# Patient Record
Sex: FEMALE | Race: WHITE | NOT HISPANIC OR LATINO | Employment: UNEMPLOYED | URBAN - NONMETROPOLITAN AREA
[De-identification: names, ages, dates, MRNs, and addresses within clinical notes are randomized per-mention and may not be internally consistent; named-entity substitution may affect disease eponyms.]

---

## 2017-11-14 DIAGNOSIS — M25.512 LEFT SHOULDER PAIN, UNSPECIFIED CHRONICITY: Primary | ICD-10-CM

## 2017-11-15 ENCOUNTER — APPOINTMENT (OUTPATIENT)
Dept: GENERAL RADIOLOGY | Facility: HOSPITAL | Age: 62
End: 2017-11-15
Attending: ORTHOPAEDIC SURGERY

## 2017-12-12 DIAGNOSIS — M25.512 LEFT SHOULDER PAIN, UNSPECIFIED CHRONICITY: Primary | ICD-10-CM

## 2017-12-13 ENCOUNTER — OFFICE VISIT (OUTPATIENT)
Dept: ORTHOPEDIC SURGERY | Facility: CLINIC | Age: 62
End: 2017-12-13

## 2017-12-13 ENCOUNTER — HOSPITAL ENCOUNTER (OUTPATIENT)
Dept: GENERAL RADIOLOGY | Facility: HOSPITAL | Age: 62
Discharge: HOME | End: 2017-12-13
Attending: ORTHOPAEDIC SURGERY | Admitting: ORTHOPAEDIC SURGERY

## 2017-12-13 VITALS
SYSTOLIC BLOOD PRESSURE: 124 MMHG | HEIGHT: 65 IN | HEART RATE: 68 BPM | WEIGHT: 133 LBS | BODY MASS INDEX: 22.16 KG/M2 | DIASTOLIC BLOOD PRESSURE: 68 MMHG

## 2017-12-13 DIAGNOSIS — M75.52 SUBACROMIAL BURSITIS OF LEFT SHOULDER JOINT: Primary | ICD-10-CM

## 2017-12-13 DIAGNOSIS — M25.512 LEFT SHOULDER PAIN, UNSPECIFIED CHRONICITY: ICD-10-CM

## 2017-12-13 PROBLEM — E07.9 THYROID DISEASE: Status: ACTIVE | Noted: 2017-12-13

## 2017-12-13 PROCEDURE — 20610 DRAIN/INJ JOINT/BURSA W/O US: CPT | Performed by: ORTHOPAEDIC SURGERY

## 2017-12-13 PROCEDURE — 73030 X-RAY EXAM OF SHOULDER: CPT

## 2017-12-13 PROCEDURE — 99203 OFFICE O/P NEW LOW 30 MIN: CPT | Performed by: ORTHOPAEDIC SURGERY

## 2017-12-13 PROCEDURE — 73030 X-RAY EXAM OF SHOULDER: CPT | Performed by: RADIOLOGY

## 2017-12-13 RX ORDER — LEVOTHYROXINE SODIUM 0.03 MG/1
TABLET ORAL
COMMUNITY
Start: 2017-12-01 | End: 2017-12-13 | Stop reason: SDUPTHER

## 2017-12-13 RX ORDER — LEVOTHYROXINE SODIUM 0.03 MG/1
TABLET ORAL
COMMUNITY
Start: 2017-09-25

## 2017-12-13 RX ORDER — OMEPRAZOLE 20 MG/1
CAPSULE, DELAYED RELEASE ORAL
COMMUNITY
Start: 2017-09-29

## 2017-12-13 RX ADMIN — LIDOCAINE HYDROCHLORIDE 2 ML: 20 INJECTION, SOLUTION INFILTRATION; PERINEURAL at 16:02

## 2017-12-13 RX ADMIN — METHYLPREDNISOLONE ACETATE 40 MG: 40 INJECTION, SUSPENSION INTRA-ARTICULAR; INTRALESIONAL; INTRAMUSCULAR; SOFT TISSUE at 16:02

## 2017-12-13 NOTE — PROGRESS NOTES
New Patient Visit      Patient: Angelica Reeves  YOB: 1955  Date of Encounter: 12/13/2017      History of Present Illness:     Angelica Reeves is a 62 y.o. female seen today secondary to a greater than one-year history of progressive onset worsening left shoulder pain.  Patient is referred today by Jony Valenzuela MD.  Patient denies specific injury.  She reports dull throbbing aching sensation to the anterior lateral shoulder worse upon range of motion or overhead activities.  She reports occasional grinding or popping sensation of the left shoulder with abduction.  She reports constant dull throbbing aching sensation which she is unable to sleep on the affected side followed by sharp stabbing pain upon overhead motions.  No prior treatment in terms of injections, therapy, NSAID medication.  Denies any paresthesia or associated neck or back pain.         Patient Active Problem List   Diagnosis   • Left shoulder pain   • Thyroid disease     No past medical history on file.  Past Surgical History:   Procedure Laterality Date   • TONSILLECTOMY       Social History     Occupational History   • Not on file.     Social History Main Topics   • Smoking status: Current Every Day Smoker   • Smokeless tobacco: Not on file   • Alcohol use No   • Drug use: No   • Sexual activity: Not on file      Social History     Social History Narrative   • No narrative on file     Family History   Problem Relation Age of Onset   • Osteoarthritis Mother    • Diabetes Mother    • Hypertension Mother    • Cancer Father    • Diabetes Father    • Heart disease Sister    • Diabetes Sister    • Hypertension Sister    • Heart disease Brother      Current Outpatient Prescriptions   Medication Sig Dispense Refill   • levothyroxine (SYNTHROID) 25 MCG tablet Take  by mouth.     • omeprazole (priLOSEC) 20 MG capsule        No current facility-administered medications for this visit.      No Known Allergies     Review of Systems   Constitution:  "Negative.   HENT: Positive for hearing loss.         Eye itching   Eyes: Negative.    Cardiovascular: Negative.    Respiratory: Negative.    Endocrine: Negative.    Hematologic/Lymphatic: Negative.    Skin: Negative.    Musculoskeletal:        Pertinent positives listed in HPI   Gastrointestinal: Negative.    Genitourinary: Negative.    Neurological: Negative.    Psychiatric/Behavioral: Negative.    Allergic/Immunologic: Negative.        Vitals:    12/13/17 1411   BP: 124/68   Pulse: 68   Weight: 60.3 kg (133 lb)   Height: 165.1 cm (65\")       Physical Exam: 62 y.o. female .  General Appearance:    Well appearing, cooperative, in no acute distress.       Patient is alert and oriented x 3.            Musculoskeletal: Examination of patient's left shoulder today reveals forward flexion to approximately 110° with mild pain upon further active range of motion.  Abduction to 90°.  Internal rotation to the lower lumbar region.  Adequate external rotation, able to the contralateral shoulder at 50°.  Patient has mild tennis palpation along the lateral acromion and subacromial area.  She has pain with Les's with intact strength upon stressing supraspinatus.  Empty can test negative.  Speeds test negative.  Mildly positive signs of impingement.  Neurovascular status grossly intact left upper extremity.      Radiology:     X-rays: 2 views left shoulder reveals no acute fractures or dislocation.  No blastic or lytic lesions.  No evidence of soft tissue mass.      Procedure:  Left subacromial Depo-Medrol injection  Date/Time: 12/13/2017 4:02 PM  Consent given by: patient  Site marked: site marked  Supporting Documentation  Indications: pain and diagnostic evaluation   Procedure Details  Location: shoulder - L subacromial bursa  Needle size: 25 G  Approach: lateral  Medications administered: 40 mg methylPREDNISolone acetate 40 MG/ML; 2 mL lidocaine 2%  Patient tolerance: patient tolerated the procedure well with no immediate " complications          Assesment:    ICD-10-CM ICD-9-CM   1. Subacromial bursitis of left shoulder joint M75.52 726.19         Plan:    Patient tolerated the injection provided today with improvement of her overall range of motion and decreased pain.  She was instructed to allow 2-3 weeks to fully evaluate the efficacy of the injection.  Depending on her response we may repeat injection versus discussion of physical therapy.  There is limited concern for full-thickness rotator cuff pathology at this point.    Discussion and Summary:    Written by Tommy Carbone PA-C, acting as scribe for Dr. Shah      CC: Jony Valenzuela MD

## 2017-12-15 RX ORDER — SODIUM CHLORIDE 9 MG/ML
INJECTION, SOLUTION INTRAVENOUS
Status: DISPENSED
Start: 2017-12-15

## 2017-12-16 RX ORDER — METHYLPREDNISOLONE ACETATE 40 MG/ML
40 INJECTION, SUSPENSION INTRA-ARTICULAR; INTRALESIONAL; INTRAMUSCULAR; SOFT TISSUE
Status: COMPLETED | OUTPATIENT
Start: 2017-12-13 | End: 2017-12-13

## 2017-12-16 RX ORDER — LIDOCAINE HYDROCHLORIDE 20 MG/ML
2 INJECTION, SOLUTION INFILTRATION; PERINEURAL
Status: COMPLETED | OUTPATIENT
Start: 2017-12-13 | End: 2017-12-13

## 2018-02-07 ENCOUNTER — OFFICE VISIT (OUTPATIENT)
Dept: ORTHOPEDIC SURGERY | Facility: CLINIC | Age: 63
End: 2018-02-07

## 2018-02-07 VITALS — WEIGHT: 133 LBS | HEIGHT: 65 IN | BODY MASS INDEX: 22.16 KG/M2

## 2018-02-07 DIAGNOSIS — M25.512 PAIN, JOINT, SHOULDER, LEFT: ICD-10-CM

## 2018-02-07 DIAGNOSIS — M75.42 IMPINGEMENT SYNDROME, SHOULDER, LEFT: Primary | ICD-10-CM

## 2018-02-07 PROCEDURE — 99406 BEHAV CHNG SMOKING 3-10 MIN: CPT | Performed by: ORTHOPAEDIC SURGERY

## 2018-02-07 PROCEDURE — 20610 DRAIN/INJ JOINT/BURSA W/O US: CPT | Performed by: ORTHOPAEDIC SURGERY

## 2018-02-07 RX ORDER — LIDOCAINE HYDROCHLORIDE 20 MG/ML
2 INJECTION, SOLUTION INFILTRATION; PERINEURAL
Status: COMPLETED | OUTPATIENT
Start: 2018-02-07 | End: 2018-02-07

## 2018-02-07 RX ORDER — METHYLPREDNISOLONE ACETATE 40 MG/ML
40 INJECTION, SUSPENSION INTRA-ARTICULAR; INTRALESIONAL; INTRAMUSCULAR; SOFT TISSUE
Status: COMPLETED | OUTPATIENT
Start: 2018-02-07 | End: 2018-02-07

## 2018-02-07 RX ADMIN — LIDOCAINE HYDROCHLORIDE 2 ML: 20 INJECTION, SOLUTION INFILTRATION; PERINEURAL at 15:11

## 2018-02-07 RX ADMIN — METHYLPREDNISOLONE ACETATE 40 MG: 40 INJECTION, SUSPENSION INTRA-ARTICULAR; INTRALESIONAL; INTRAMUSCULAR; SOFT TISSUE at 15:11

## 2018-02-07 NOTE — PROGRESS NOTES
"Angelica Reeves   :1955    Date of encounter:2018        HPI:  Angelica Reeves is a 62 y.o. with left shoulder pain presents today in follow-up.  She was first seen 2017.  Her findings were felt to be consistent with subacromial bursitis left shoulder and she received steroid injection, she reports several week of significant improvement.  She is a poor historian and has difficulty describing her symptoms or quantitating her improvement with steroid injection.    PMH:   Patient Active Problem List   Diagnosis   • Left shoulder pain   • Thyroid disease       Exam:  General Appearance:    62 y.o. female  cooperative, in no acute distress.  Alert and oriented x 3,                 Left shoulder evaluation reveals full mobility.  She has mild signs of impingement.  She has full internal and external rotation.  Speed's test is negative Les's test is negative neurovascular intact.  Vitals:    18 1416   Weight: 60.3 kg (133 lb)   Height: 165.1 cm (65\")          Body mass index is 22.13 kg/(m^2).      Assessment:  62-year-old female with subacromial bursitis left shoulder which is improved in the past with steroid injection.  After discussion, today she is given repeat injection Depo-Medrol milligrams subacromial space.  She did get some benefit from this.  She will return on an as-needed basis.  Should she continue to experience shoulder pain after these 2 previous steroid injections, we'll consider MRI of her left shoulder.    ICD-10-CM ICD-9-CM   1. Impingement syndrome, shoulder, left M75.42 726.2   2. Pain, joint, shoulder, left M25.512 719.41       Plan:   Follow-up as needed.  Large Joint Arthrocentesis  Date/Time: 2018 3:11 PM  Consent given by: patient  Supporting Documentation  Indications: pain   Procedure Details  Location: shoulder - L subacromial bursa  Preparation: Patient was prepped and draped in the usual sterile fashion  Needle size: 25 G  Approach: lateral  Medications " administered: 40 mg methylPREDNISolone acetate 40 MG/ML; 2 mL lidocaine 2%  Patient tolerance: patient tolerated the procedure well with no immediate complications        DILEEP [place]             I advised the patient of the risks in continuing to use tobacco, and I provided this patient with smoking cessation educational materials.  I also discussed how to quit smoking and the patient has expressed the willingness to quit.      During this visit, I spent 3-10 mintues counseling the patient regarding smoking cessation.

## 2021-02-25 DIAGNOSIS — Z23 IMMUNIZATION DUE: ICD-10-CM
